# Patient Record
Sex: MALE | Race: ASIAN | NOT HISPANIC OR LATINO
[De-identification: names, ages, dates, MRNs, and addresses within clinical notes are randomized per-mention and may not be internally consistent; named-entity substitution may affect disease eponyms.]

---

## 2022-11-04 ENCOUNTER — NON-APPOINTMENT (OUTPATIENT)
Age: 52
End: 2022-11-04

## 2022-11-04 ENCOUNTER — APPOINTMENT (OUTPATIENT)
Dept: ORTHOPEDIC SURGERY | Facility: CLINIC | Age: 52
End: 2022-11-04

## 2022-11-04 VITALS — WEIGHT: 175 LBS | HEIGHT: 70 IN | BODY MASS INDEX: 25.05 KG/M2

## 2022-11-04 PROBLEM — Z00.00 ENCOUNTER FOR PREVENTIVE HEALTH EXAMINATION: Status: ACTIVE | Noted: 2022-11-04

## 2022-11-04 PROCEDURE — 73610 X-RAY EXAM OF ANKLE: CPT | Mod: LT

## 2022-11-04 PROCEDURE — 73630 X-RAY EXAM OF FOOT: CPT | Mod: LT

## 2022-11-04 PROCEDURE — 73590 X-RAY EXAM OF LOWER LEG: CPT | Mod: LT

## 2022-11-04 PROCEDURE — 99203 OFFICE O/P NEW LOW 30 MIN: CPT

## 2022-11-06 NOTE — PHYSICAL EXAM
[de-identified] : Constitutional:  52 year old male, alert and oriented, cooperative, in no acute distress.\par \par HEENT \par NC/AT.  Appearance: symmetric\par \par Neck/Back\par Straight without deformity or instability.  Good ROM.\par \par Chest/Respiratory \par Respiratory effort: no intercostal retractions or use of accessory muscles. Nonlabored Breathing\par \par Mental Status: \par Judgment, insight: intact\par Orientation: oriented to time, place, and person\par \par Neurological:\par Sensory and Motor are grossly intact throughout\par \par Left Ankle \par \par Inspection:\par     Skin intact, no rashes or lesions\par     Mild left lateral ankle swelling. No significant medial ankle swelling\par     Non-tender to palpation over CFL, PITFL and Deltoid Ligaments\par     Tenderness over the AITFL and Anterior ankle capsule\par     Non-tender over the fibula, lateral malleolus and medial malleolus\par     Non-tender of the 5th metatarsal, foot and midfoot\par \par Range of Motion:\par 	Dorsiflexion - 30 degrees\par 	Plantarflexion - 45 degrees\par \par Stability:\par      Demonstrates no instability\par      Negative Anterior or Posterior drawer. \par \par Neurologic Exam\par     Motor intact including 5/5 Extensor Hallucis Longus, 5/5 Flexor Hallucis Longus, 5/5 Tibialis Anterior and 5/5 Gastrocnemius\par     Sensation Intact to Light Touch including Saphenous, Sural, Superficial Peroneal, Deep Peroneal, Tibial nerve distributions\par \par Vascular Exam\par     Foot is warm and well perfused with 2+ Dorsalis Pedis Pulse \par \par No pain with range of motion of the bilateral hips or knees. No lumbar paraspinal muscle tenderness.  [de-identified] : XRay:  XRays of the Left Ankle (3 Views), Left Foot (3 Views) and Left Tibia/Fibula (2 Views) taken in the office today and discussed with the patient. XRays demonstrate no obvious fracture or dislocation. There is no significant evidence of osteoarthritis or osteophyte formation. (my personal interpretation).\par

## 2022-11-06 NOTE — HISTORY OF PRESENT ILLNESS
[de-identified] : Mr. Be is a 52-year-old male who presented to the office for evaluation of his left ankle pain.  Patient has been experiencing left ankle pain for about 3 days.  On 11/1/2022, patient was running when he twisted his left ankle.  He states that he heard a pop, but was able to continue running and finished his 5 mile run.  Patient is a frequent runner, running about 40 to 50 miles per week.  He states that he has a longstanding history of occasional left ankle sprains and feels that the left ankle may be weaker than the right.  Patient has tried to elevate the left ankle.  He states that the pain has worsened somewhat over the last few days.  Pain is mostly located over the left lateral ankle.  There is no significant medial ankle pain.  He has tried Advil and night, with some relief.  He ice the ankle for about 1 day.  No subsequent trauma.  No fevers or chills.  No numbness or tingling.

## 2022-11-06 NOTE — DISCUSSION/SUMMARY
[de-identified] : Mr. Be is a 52-year-old male who presents to the office for evaluation of his left ankle pain.  Patient experienced a left ankle twisting injury on 11/1/2022.  Since that time, he has experienced left ankle pain. XRays showed no obvious fractures or dislocations.  Examination showed mild left lateral ankle swelling and some tenderness over the AITFL and anterior ankle capsule.  Discussed with the patient the management of left ankle sprains, including physical therapy, bracing, rest, ice, and elevation.  Due to patient's recurrent left ankle sprains, patient was given a referral to physical therapy.  He was given a lace up ankle brace for the left ankle.  Patient was recommended to rest, ice, and elevate the left ankle.  He will follow-up in 3 to 4 weeks for reevaluation and management.  Patient understanding and in agreement with the plan.  All questions answered.\par \par Plan:\par - Physical Therapy for recurrent left ankle sprains\par - Lace Up ankle brace for left ankle\par - Rest, Ice, and Elevate Left Ankle\par - Follow up in 3 to 4 weeks for reevaluation and management

## 2022-11-06 NOTE — REVIEW OF SYSTEMS
[Joint Pain] : joint pain [Joint Swelling] : joint swelling [Joint Stiffness] : no joint stiffness [Fever] : no fever [Chills] : no chills [FreeTextEntry5] : No chest pain [FreeTextEntry6] : No shortness of breath [FreeTextEntry7] : No stomach issues or ulcer [FreeTextEntry8] : No kidney issues [de-identified] : No fevers/chills [de-identified] : No numbness/tingling [de-identified] : No diabetes

## 2022-11-16 ENCOUNTER — APPOINTMENT (OUTPATIENT)
Dept: ORTHOPEDIC SURGERY | Facility: CLINIC | Age: 52
End: 2022-11-16

## 2022-11-16 DIAGNOSIS — S93.492D SPRAIN OF OTHER LIGAMENT OF LEFT ANKLE, SUBSEQUENT ENCOUNTER: ICD-10-CM

## 2022-11-16 DIAGNOSIS — M21.6X2 OTHER ACQUIRED DEFORMITIES OF LEFT FOOT: ICD-10-CM

## 2022-11-16 DIAGNOSIS — S99.912D UNSPECIFIED INJURY OF LEFT ANKLE, SUBSEQUENT ENCOUNTER: ICD-10-CM

## 2022-11-16 DIAGNOSIS — M21.42 FLAT FOOT [PES PLANUS] (ACQUIRED), LEFT FOOT: ICD-10-CM

## 2022-11-16 PROCEDURE — 99214 OFFICE O/P EST MOD 30 MIN: CPT

## 2022-11-20 PROBLEM — M21.42 ACQUIRED PES PLANUS OF LEFT FOOT: Status: ACTIVE | Noted: 2022-11-20

## 2022-11-20 PROBLEM — M21.6X2 GASTROCNEMIUS EQUINUS OF LEFT LOWER EXTREMITY: Status: ACTIVE | Noted: 2022-11-20

## 2022-11-20 PROBLEM — S93.492D SPRAIN OF ANTERIOR TALOFIBULAR LIGAMENT OF LEFT ANKLE, SUBSEQUENT ENCOUNTER: Status: ACTIVE | Noted: 2022-11-04

## 2022-11-20 PROBLEM — S99.912D ANKLE INJURY, LEFT, SUBSEQUENT ENCOUNTER: Status: ACTIVE | Noted: 2022-11-20

## 2024-07-11 ENCOUNTER — EMERGENCY (EMERGENCY)
Facility: HOSPITAL | Age: 54
LOS: 0 days | Discharge: HOME | End: 2024-07-11
Attending: STUDENT IN AN ORGANIZED HEALTH CARE EDUCATION/TRAINING PROGRAM
Payer: MEDICAID

## 2024-07-11 VITALS
HEIGHT: 70 IN | HEART RATE: 99 BPM | TEMPERATURE: 98 F | OXYGEN SATURATION: 97 % | DIASTOLIC BLOOD PRESSURE: 104 MMHG | WEIGHT: 184.97 LBS | SYSTOLIC BLOOD PRESSURE: 160 MMHG | RESPIRATION RATE: 18 BRPM

## 2024-07-11 VITALS
TEMPERATURE: 98 F | RESPIRATION RATE: 19 BRPM | OXYGEN SATURATION: 97 % | HEART RATE: 60 BPM | SYSTOLIC BLOOD PRESSURE: 133 MMHG | DIASTOLIC BLOOD PRESSURE: 89 MMHG
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
## 2024-07-11 DIAGNOSIS — S81.012A LACERATION WITHOUT FOREIGN BODY, LEFT KNEE, INITIAL ENCOUNTER: ICD-10-CM
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
## 2024-07-11 DIAGNOSIS — M25.531 PAIN IN RIGHT WRIST: ICD-10-CM

## 2024-07-11 DIAGNOSIS — Z23 ENCOUNTER FOR IMMUNIZATION: ICD-10-CM

## 2024-07-11 DIAGNOSIS — M25.562 PAIN IN LEFT KNEE: ICD-10-CM

## 2024-07-11 PROCEDURE — 73090 X-RAY EXAM OF FOREARM: CPT | Mod: 26,RT

## 2024-07-11 PROCEDURE — 73110 X-RAY EXAM OF WRIST: CPT | Mod: 26,RT

## 2024-07-11 PROCEDURE — 73130 X-RAY EXAM OF HAND: CPT | Mod: 26,RT

## 2024-07-11 PROCEDURE — 99284 EMERGENCY DEPT VISIT MOD MDM: CPT | Mod: 25

## 2024-07-11 PROCEDURE — 73564 X-RAY EXAM KNEE 4 OR MORE: CPT | Mod: 26,LT

## 2024-07-11 PROCEDURE — 12002 RPR S/N/AX/GEN/TRNK2.6-7.5CM: CPT

## 2024-07-11 RX ORDER — AMOXICILLIN/POTASSIUM CLAV 250-125 MG
875 TABLET ORAL
Qty: 20 | Refills: 0
Start: 2024-07-11 | End: 2024-07-20

## 2024-07-11 RX ORDER — TETANUS TOXOID, REDUCED DIPHTHERIA TOXOID AND ACELLULAR PERTUSSIS VACCINE, ADSORBED 5; 2.5; 8; 8; 2.5 [IU]/.5ML; [IU]/.5ML; UG/.5ML; UG/.5ML; UG/.5ML
0.5 SUSPENSION INTRAMUSCULAR ONCE
Refills: 0 | Status: COMPLETED | OUTPATIENT
Start: 2024-07-11 | End: 2024-07-11

## 2024-07-11 RX ADMIN — TETANUS TOXOID, REDUCED DIPHTHERIA TOXOID AND ACELLULAR PERTUSSIS VACCINE, ADSORBED 0.5 MILLILITER(S): 5; 2.5; 8; 8; 2.5 SUSPENSION INTRAMUSCULAR at 16:19

## 2024-07-11 RX ADMIN — Medication 600 MILLIGRAM(S): at 16:19
